# Patient Record
Sex: FEMALE
[De-identification: names, ages, dates, MRNs, and addresses within clinical notes are randomized per-mention and may not be internally consistent; named-entity substitution may affect disease eponyms.]

---

## 2017-06-04 ENCOUNTER — HOSPITAL ENCOUNTER (EMERGENCY)
Dept: HOSPITAL 31 - C.ER | Age: 37
Discharge: HOME | End: 2017-06-04
Payer: COMMERCIAL

## 2017-06-04 VITALS
TEMPERATURE: 98.6 F | SYSTOLIC BLOOD PRESSURE: 106 MMHG | OXYGEN SATURATION: 97 % | DIASTOLIC BLOOD PRESSURE: 64 MMHG | HEART RATE: 82 BPM

## 2017-06-04 VITALS — RESPIRATION RATE: 18 BRPM

## 2017-06-04 DIAGNOSIS — N83.209: Primary | ICD-10-CM

## 2017-06-04 LAB
ALBUMIN/GLOB SERPL: 1.6 {RATIO}
ALP SERPL-CCNC: 54 U/L
ALT SERPL-CCNC: 19 U/L
AST SERPL-CCNC: 19 U/L
BACTERIA #/AREA URNS HPF: (no result) /[HPF]
BASOPHILS # BLD AUTO: 0.1 K/UL
BASOPHILS NFR BLD: 0.7 %
BILIRUB SERPL-MCNC: 0.6 MG/DL
BILIRUB UR-MCNC: NEGATIVE MG/DL
BUN SERPL-MCNC: 10 MG/DL
CALCIUM SERPL-MCNC: 9.5 MG/DL
CHLORIDE SERPL-SCNC: 100 MMOL/L
CO2 SERPL-SCNC: 27 MMOL/L
EOSINOPHIL # BLD AUTO: 0.1 K/UL
EOSINOPHIL NFR BLD: 1.3 %
ERYTHROCYTE [DISTWIDTH] IN BLOOD BY AUTOMATED COUNT: 12.2 %
GLOBULIN SER-MCNC: 2.9 GM/DL
GLUCOSE SERPL-MCNC: 92 MG/DL
GLUCOSE UR STRIP-MCNC: NORMAL MG/DL
HCT VFR BLD CALC: 37.8 %
KETONES UR STRIP-MCNC: NEGATIVE MG/DL
LEUKOCYTE ESTERASE UR-ACNC: (no result) LEU/UL
LYMPHOCYTES # BLD AUTO: 2.3 K/UL
LYMPHOCYTES NFR BLD AUTO: 24.5 %
MCH RBC QN AUTO: 28.8 PG
MCHC RBC AUTO-ENTMCNC: 33 G/DL
MCV RBC AUTO: 87.4 FL
MONOCYTES # BLD: 0.6 K/UL
MONOCYTES NFR BLD: 6.7 %
NRBC BLD AUTO-RTO: 0 %
PH UR STRIP: 7 [PH]
PLATELET # BLD: 248 K/UL
PMV BLD AUTO: 10.8 FL
POTASSIUM SERPL-SCNC: 4.1 MMOL/L
PROT SERPL-MCNC: 7.7 G/DL
PROT UR STRIP-MCNC: NEGATIVE MG/DL
RBC # UR STRIP: NEGATIVE /UL
RBC #/AREA URNS HPF: < 1 /HPF
SODIUM SERPL-SCNC: 139 MMOL/L
SP GR UR STRIP: 1
UROBILINOGEN UR-MCNC: NORMAL MG/DL
WBC # BLD AUTO: 9.6 K/UL
WBC #/AREA URNS HPF: < 1 /HPF

## 2017-06-04 PROCEDURE — 81001 URINALYSIS AUTO W/SCOPE: CPT

## 2017-06-04 PROCEDURE — 99285 EMERGENCY DEPT VISIT HI MDM: CPT

## 2017-06-04 PROCEDURE — 80053 COMPREHEN METABOLIC PANEL: CPT

## 2017-06-04 PROCEDURE — 85025 COMPLETE CBC W/AUTO DIFF WBC: CPT

## 2017-06-04 PROCEDURE — 84703 CHORIONIC GONADOTROPIN ASSAY: CPT

## 2017-06-04 PROCEDURE — 96374 THER/PROPH/DIAG INJ IV PUSH: CPT

## 2017-06-04 PROCEDURE — 74177 CT ABD & PELVIS W/CONTRAST: CPT

## 2017-06-04 PROCEDURE — 83690 ASSAY OF LIPASE: CPT

## 2017-06-04 PROCEDURE — 96361 HYDRATE IV INFUSION ADD-ON: CPT

## 2017-06-04 NOTE — CT
EXAM:

  CT Abdomen and Pelvis With Intravenous Contrast



CLINICAL HISTORY:

  37 years old, female; Pain; Abdominal pain; Generalized; Additional info: Abd 

pain



TECHNIQUE:

  Axial computed tomography images of the abdomen and pelvis with intravenous 

contrast.  This CT exam was performed using one or more of the following dose 

reduction techniques:  automated exposure control, adjustment of the mA and/or 

kV according to patient size, and/or use of iterative reconstruction technique.

  Coronal and sagittal reformatted images were created and reviewed.



CONTRAST:

  100 mL of [id number] administered intravenously.



EXAM DATE/TIME:

  6/4/2017 6:38 PM



COMPARISON:

  No relevant prior studies available.



FINDINGS:

  LIMITATIONS:  Exam is somewhat limited by mild streak/motion artifact.

  LOWER THORAX:  No infiltrate seen in the lung bases.



 ABDOMEN:

  LIVER:  No acute abnormality of the liver identified.

  GALLBLADDER AND BILE DUCTS:  No CT evidence of acute cholecystitis.  No 

evidence of significant biliary ductal dilatation.

  PANCREAS:  No CT evidence of acute pancreatitis.

  SPLEEN:  No acute abnormality of the spleen identified.

  ADRENALS:  No acute abnormality of the adrenal glands identified.

  KIDNEYS AND URETERS:  No acute abnormality of the kidneys identified. No 

evidence of significant hydrouereteronephrosis.

  STOMACH AND BOWEL:  No acute abnormality of the stomach or duodenum 

identified. No evidence of small bowel obstruction. No acute abnormality of the 

colon identified.

  APPENDIX:  Normal appendix is not seen, however, there are no significant 

inflammatory changes visualized in the expected location of the appendix to 

suggest appendicitis. Recommend clinical correlation.



 PELVIS:

  BLADDER:  Mild thickening of the bladder wall.

  REPRODUCTIVE:  2.5 cm cystic lesion with a thin, enhancing and collapsed soft 

tissue rim in the left ovary.  This has an appearance suggestive of a recently 

ruptured/involuting ovarian cyst, such as a corpus luteal cyst.  Followup 

pelvic ultrasound as clinically indicated.



 ABDOMEN and PELVIS:

  INTRAPERITONEAL SPACE:  Small amount of free fluid in the cul-de-sac. This is 

most likely physiologic in nature.  No evidence of free air.

  BONES/JOINTS:  No acute fractures or other acute bony abnormality noted.

  SOFT TISSUES:  Small umbilical hernia, containing only fat.

  VASCULATURE:  No evidence of abdominal aortic aneurysm. No evidence of 

periaortic hemorrhage.

  LYMPH NODES:  No evidence of diffuse lymphadenopathy.



IMPRESSION:     

- Mild bladder wall thickening. This is a nonspecific finding, but can be seen 

with cystitis. Recommend clinical correlation.

- Otherwise, no evidence of significant acute process. Appendix is not seen, 

however.

- Small amount of free fluid in the cul-de-sac, most likely physiologic in 

nature.

- Findings compatible with a small right involuting cystic lesion in the left 

ovary.

- See above for remaining findings.

## 2017-06-04 NOTE — C.PDOC
History Of Present Illness


Patient is a 38 y/o female that presents to the emergency department for 

evaluation of left sided abdominal pain associated with nausea for the last 2 

weeks. Pt states her pain radiates from the LUQ to the LLQ, and is described as 

constant in nature. Pt states that pain is worse when sitting, and laying down, 

and is better with walking. Otherwise, denies any vomiting, diarrhea, 

constipation, change in bowel habits, urinary symptoms, back pain, fever, chills

, or any other associated symptoms at this time.





Time Seen by Provider: 06/04/17 18:33


Chief Complaint (Nursing): Abdominal Pain


History Per: Patient


History/Exam Limitations: no limitations


Onset/Duration Of Symptoms: Days (2 weeks), Persistent


Current Symptoms Are (Timing): Still Present


Location Of Pain/Discomfort: LUQ, LLQ


Radiation Of Pain To:: None


Quality Of Discomfort: "Pain"


Associated Symptoms: Nausea.  denies: Fever, Chills, Vomiting, Diarrhea, Loss 

Of Appetite, Back Pain, Chest Pain, Constipation, Urinary Symptoms


Exacerbating Factors: Supine, Other (sitting)


Alleviating Factors: None


Recent travel outside of the [place]: No


Additional History Per: Patient


Abnormal Vaginal Bleeding: No





Past Medical History


Reviewed: Historical Data, Nursing Documentation, Vital Signs


Vital Signs: 


 Last Vital Signs











Temp  98.1 F   06/04/17 17:22


 


Pulse  90   06/04/17 17:22


 


Resp  18   06/04/17 17:22


 


BP  127/80   06/04/17 17:22


 


Pulse Ox  100   06/04/17 22:13














- Medical History


PMH: Back Problems, Migraine


Family History: States: No Known Family Hx





- Social History


Hx Tobacco Use: No


Hx Alcohol Use: No


Hx Substance Use: No





- Immunization History


Hx Tetanus Toxoid Vaccination: No


Hx Influenza Vaccination: No


Hx Pneumococcal Vaccination: No





Review Of Systems


Except As Marked, All Systems Reviewed And Found Negative.


Constitutional: Negative for: Fever, Chills


Gastrointestinal: Positive for: Nausea, Abdominal Pain.  Negative for: Vomiting

, Diarrhea, Constipation


Genitourinary: Negative for: Dysuria, Frequency, Incontinence, Hematuria, 

Vaginal Discharge


Musculoskeletal: Negative for: Back Pain





Physical Exam





- Physical Exam


Appears: Non-toxic, No Acute Distress


Skin: Normal Color, Warm, Dry


Head: Atraumatic, Normacephalic


Eye(s): bilateral: Normal Inspection, EOMI


Neck: Normal ROM, Supple


Chest: Symmetrical


Cardiovascular: Rhythm Regular


Respiratory: Normal Breath Sounds, No Rales, No Rhonchi, No Wheezing


Gastrointestinal/Abdominal: Soft, Tenderness (LUQ, LLQ, mid abdomen), No 

Distention, Guarding, No Rebound


Extremity: Normal ROM


Neurological/Psych: Oriented x3, Normal Speech, Normal Cognition





ED Course And Treatment





- Laboratory Results


Result Diagrams: 


 06/04/17 19:17





 06/04/17 19:17


Lab Interpretation: Normal


O2 Sat by Pulse Oximetry: 100 (on RA)


Pulse Ox Interpretation: Normal





- CT Scan/US


  ** CT ABDOMEN AND PELVIS


Other Rad Studies (CT/US): Read By Radiologist, Radiology Report Reviewed


CT/US Interpretation: EXAM:  CT Abdomen and Pelvis With Intravenous Contrast.  

CLINICAL HISTORY:  37 years old, female; Pain; Abdominal pain; Generalized; 

Additional info: Abd pain.  TECHNIQUE:  Axial computed tomography images of the 

abdomen and pelvis with intravenous contrast. This CT.  exam was performed 

using one or more of the following dose reduction techniques: automated.  

exposure control, adjustment of the mA and/or kV according to patient size, and/

or use of iterative.  reconstruction technique.  Coronal and sagittal 

reformatted images were created and reviewed.  CONTRAST:  100 mL of [id number] 

administered intravenously.  EXAM DATE/TIME:  6/4/2017 6:38 PM.  COMPARISON:  

No relevant prior studies available.  FINDINGS:  LIMITATIONS: Exam is somewhat 

limited by mild streak/motion artifact.  LOWER THORAX: No infiltrate seen in 

the lung bases.  ABDOMEN:  LIVER: No acute abnormality of the liver identified.

  GALLBLADDER AND BILE DUCTS: No CT evidence of acute cholecystitis. No 

evidence of.  significant biliary ductal dilatation.  PANCREAS: No CT evidence 

of acute pancreatitis.  SPLEEN: No acute abnormality of the spleen identified.  

ADRENALS: No acute abnormality of the adrenal glands identified.  KIDNEYS AND 

URETERS: No acute abnormality of the kidneys identified. No evidence of.  

significant hydrouereteronephrosis.  STOMACH AND BOWEL: No acute abnormality of 

the stomach or duodenum identified. No.  evidence of small bowel obstruction. 

No acute abnormality of the colon identified.  APPENDIX: Normal appendix is not 

seen, however, there are no significant inflammatory changes.  visualized in 

the expected location of the appendix to suggest appendicitis. Recommend 

clinical.  correlation.  PELVIS:  BLADDER: Mild thickening of the bladder wall.

  REPRODUCTIVE: 2.5 cm cystic lesion with a thin, enhancing and collapsed soft 

tissue rim in the left.  ovary. This has an appearance suggestive of a recently 

ruptured/involuting ovarian cyst, such as a.  corpus luteal cyst. Followup 

pelvic ultrasound as clinically indicated.  ABDOMEN and PELVIS:  

INTRAPERITONEAL SPACE: Small amount of free fluid in the cul-de-sac. This is 

most likely.  physiologic in nature. No evidence of free air.  BONES/JOINTS: No 

acute fractures or other acute bony abnormality noted.  SOFT TISSUES: Small 

umbilical hernia, containing only fat.  VASCULATURE: No evidence of abdominal 

aortic aneurysm. No evidence of periaortic.  hemorrhage.  LYMPH NODES: No 

evidence of diffuse lymphadenopathy.  IMPRESSION:  - Mild bladder wall 

thickening. This is a nonspecific finding, but can be seen with cystitis.  

Recommend clinical correlation.  - Otherwise, no evidence of significant acute 

process. Appendix is not seen, however.  - Small amount of free fluid in the cul

-de-sac, most likely physiologic in nature.  - Findings compatible with a small 

right involuting cystic lesion in the left ovary.  - See above for remaining 

findings.


Progress Note: Labs, abd & pelvis CT was ordered and reviewed. Patient was 

given IV fluids, Iohexol, and Zofran in the ER.


Reevaluation Time: 22:17


Reassessment Condition: Improved (Patient remains comfortable.)





Disposition





- Disposition


Referrals: 


Stevenson Fuller MD [Staff Provider] - 


Disposition: HOME/ ROUTINE


Disposition Time: 22:17


Condition: STABLE


Prescriptions: 


Naproxen [Naprosyn] 500 mg PO BID PRN #30 tablet


 PRN Reason: Pain, Severe (8-10)


Instructions:  Ovarian Cyst (ED)





- Clinical Impression


Clinical Impression: 


 Ovarian cyst








- Scribe Statement


The provider has reviewed the documentation as recorded by the Hema Fuller


Provider Attestation: 








All medical record entries made by the Hema were at my direction and 

personally dictated by me. I have reviewed the chart and agree that the record 

accurately reflects my personal performance of the history, physical exam, 

medical decision making, and the department course for this patient. I have 

also personally directed, reviewed, and agree with the discharge instructions 

and disposition.

## 2018-01-10 ENCOUNTER — HOSPITAL ENCOUNTER (OUTPATIENT)
Dept: HOSPITAL 31 - C.SDS | Age: 38
Discharge: HOME | End: 2018-01-10
Attending: OBSTETRICS & GYNECOLOGY
Payer: COMMERCIAL

## 2018-01-10 VITALS
SYSTOLIC BLOOD PRESSURE: 114 MMHG | RESPIRATION RATE: 15 BRPM | DIASTOLIC BLOOD PRESSURE: 68 MMHG | HEART RATE: 66 BPM | TEMPERATURE: 97.6 F

## 2018-01-10 VITALS — OXYGEN SATURATION: 100 %

## 2018-01-10 DIAGNOSIS — N87.0: Primary | ICD-10-CM

## 2018-01-10 PROCEDURE — 88305 TISSUE EXAM BY PATHOLOGIST: CPT

## 2018-01-10 PROCEDURE — 88307 TISSUE EXAM BY PATHOLOGIST: CPT

## 2018-01-10 PROCEDURE — 57522 CONIZATION OF CERVIX: CPT

## 2018-01-17 NOTE — OP
PROCEDURE DATE:



PREOPERATIVE DIAGNOSIS:  A 37-year-old  2, para 2 with cervical

dysplasia.



POSTOPERATIVE DIAGNOSIS:  A 37-year-old  2, para 2 with cervical

dysplasia.



SURGEON:  Jona Granda MD.



ASSISTANT:  None.



TYPE OF ANESTHESIA:  General.



ANESTHESIA ADMINISTERED BY:  Dr. Bagley.



PROCEDURE PERFORMED:  Loop electrosurgical excision procedure.



DESCRIPTION OF PROCEDURE:  After informed consent was obtained, the patient

was brought to the operating room, placed on the table where general

anesthesia was given.  She was prepped and draped in normal sterile

fashion.  Anterior lip of the cervix was grasped with a tenaculum.  A 20 mL

of lidocaine was given.  After that, 2 mm electrode was used to do the

LEEP.  When the whole portion of the LEEP was done, then the ECC was done. 

Then _____ was done.  It was unremarkable.  The patient tolerated the

procedure well.  _____.







__________________________________________

Jona Granda MD





DD:  2018 13:24:35

DT:  2018 19:08:52

Job # [id number]

## 2018-06-20 ENCOUNTER — HOSPITAL ENCOUNTER (OUTPATIENT)
Dept: HOSPITAL 31 - C.SDS | Age: 38
Discharge: HOME | End: 2018-06-20
Attending: OBSTETRICS & GYNECOLOGY
Payer: COMMERCIAL

## 2018-06-20 VITALS
DIASTOLIC BLOOD PRESSURE: 63 MMHG | SYSTOLIC BLOOD PRESSURE: 113 MMHG | HEART RATE: 90 BPM | OXYGEN SATURATION: 98 % | TEMPERATURE: 97.9 F

## 2018-06-20 VITALS — RESPIRATION RATE: 16 BRPM

## 2018-06-20 DIAGNOSIS — Z90.49: ICD-10-CM

## 2018-06-20 DIAGNOSIS — Z90.13: ICD-10-CM

## 2018-06-20 DIAGNOSIS — Z98.890: ICD-10-CM

## 2018-06-20 DIAGNOSIS — Z79.1: ICD-10-CM

## 2018-06-20 DIAGNOSIS — N73.6: ICD-10-CM

## 2018-06-20 DIAGNOSIS — N83.201: Primary | ICD-10-CM

## 2018-06-20 PROCEDURE — 58662 LAPAROSCOPY EXCISE LESIONS: CPT

## 2018-06-20 PROCEDURE — 88304 TISSUE EXAM BY PATHOLOGIST: CPT

## 2018-06-20 PROCEDURE — 88104 CYTOPATH FL NONGYN SMEARS: CPT

## 2018-06-20 PROCEDURE — 49329 UNLSTD LAPS PX ABD PERTM&OMN: CPT

## 2018-06-20 PROCEDURE — 88305 TISSUE EXAM BY PATHOLOGIST: CPT

## 2018-06-20 RX ADMIN — HYDROMORPHONE HYDROCHLORIDE PRN MG: 1 INJECTION, SOLUTION INTRAMUSCULAR; INTRAVENOUS; SUBCUTANEOUS at 09:30

## 2018-06-20 RX ADMIN — HYDROMORPHONE HYDROCHLORIDE PRN MG: 1 INJECTION, SOLUTION INTRAMUSCULAR; INTRAVENOUS; SUBCUTANEOUS at 09:20

## 2018-06-20 NOTE — PCM.SURG1
Surgeon's Initial Post Op Note





- Surgeon's Notes


Surgeon: dr friedman


Assistant: dr ford


Type of Anesthesia: General LMA


Anesthesia Administered By: dr hinkle


Pre-Operative Diagnosis: 38 yr  with pelvic pain/pelvic mass


Operative Findings: se the op reort


Post-Operative Diagnosis: same


Operation Performed: diagnosic lap, right ovarian cystectomy


Specimen/Specimens Removed: right ovary cyst


Estimated Blood Loss: EBL {In ML}: 50


Blood Products Given: Autologous (Cell Saver)


Drains Used: No Drains


Post-Op Condition: Good


Date of Surgery/Procedure: 18


Time of Surgery/Procedure: 09:30

## 2018-06-21 NOTE — OP
PROCEDURE DATE:  2018



PREOPERATIVE DIAGNOSIS:  A 38-year-old  2, para 2 with pelvic pain

and right ovarian mass.



POSTOPERATIVE DIAGNOSIS:  A 38-year-old  2, para 2 with pelvic pain

and right ovarian mass.



SURGEON:  Jona Granda MD



ASSISTANT:   Dr. Rueda, who was present throughout the surgical exposure.



SURGERY PERFORMED:  Diagnostic laparoscopy, laparoscopic right ovarian

cystectomy, and lysis of adhesion.



COMPLICATIONS:  None.



ESTIMATE BLOOD LOSS:  50 mL.



TYPE OF ANESTHESIA:  General anesthesia.



ANESTHESIOLOGIST:  Dr. Earl.



DESCRIPTION OF PROCEDURE:  After informed consent was obtained, the patient

was brought to the operating room, placed on the table where general

anesthesia was given.  Once anesthesia was found to be sufficient, she was

prepped and draped in normal sterile fashion.  Examination revealed uterus

to be 7-week size.  No pelvic or adnexal masses.  The anterior lip of

cervix was grasped with a tenaculum.  Gentle dilatation of the cervix was

done.  HUMI catheter was inserted for manipulation of the uterus.  After

that, attention was turned towards the patient's abdomen.  The lower

uterine segment incision was made to the umbilicus, 10 mL of Marcaine was

given and lifted up.  The skin was cut with a knife, and then the fascia

was lifted up with the two Kochers.  It was cut and tied up with 2-0

chromic, 2-0 Vicryl.  After that, intra-abdominal placement with Rodríguez was

confirmed using gas.  After that, a 5-mm port was placed on the left side. 

There was adhesion of omentum going to the pelvic wall and after that on

the right side of window, a mass which was 4-5 cm.  A decision was made to

place two 5-mm ports on the left and the right side which was placed under

direct visualization.  After that, pelvic cytology was taken and it was

sent for pathology.  After that, adhesions were taken on the left side

using LigaSure while lifting up on the other side.  There was good

hemostasis.  No bleeding.  Then, the decision was made to do right ovarian

cystectomy.  The left ovary looks normal.  Then, the right ovary was lifted

up.  Then, it was cut and cauterized with the LigaSure.  The cystectomy was

performed.  After that, the ovarian site was cauterized.  Lot of irrigation

was done and found to be hemostatic.  Then after that, EndoCatch was placed

at the site of the camera.  After that then, the right ovarian cyst was

placed in EndoCatch that was taken out.  The left and right ovaries look

normal.  No bleeding.  A lot of irrigation was done and found to be

hemostatic, no bleeding.  Then, the FloSeal was placed and hemostasis. 

After that, all the ports were removed.  A 2-0 Vicryl was used for the

fascia at the site of umbilicus, and the skin was closed using a 3-0

Monocryl.  The patient tolerated the procedure well.  Lap, sponge, and

instrument counts were correct x2.  The patient was given Bactrim DS and

pain medications.  To follow up with Dr. Granda in two weeks.





__________________________________________

Jona Granda MD





DD:  2018 19:15:19

DT:  2018 1:45:49

Job # [id number]

## 2018-07-11 ENCOUNTER — HOSPITAL ENCOUNTER (EMERGENCY)
Dept: HOSPITAL 31 - C.ER | Age: 38
Discharge: HOME | End: 2018-07-11
Payer: COMMERCIAL

## 2018-07-11 VITALS — RESPIRATION RATE: 18 BRPM | OXYGEN SATURATION: 99 %

## 2018-07-11 VITALS — HEART RATE: 78 BPM | SYSTOLIC BLOOD PRESSURE: 122 MMHG | TEMPERATURE: 99 F | DIASTOLIC BLOOD PRESSURE: 82 MMHG

## 2018-07-11 DIAGNOSIS — J90: Primary | ICD-10-CM

## 2018-07-11 NOTE — RAD
Date of service: 



07/11/2018



HISTORY:

cough  



COMPARISON:

Chest radiograph dated 01/04/2016



TECHNIQUE:

Chest PA and lateral



FINDINGS:



LUNGS:

No active pulmonary disease.



PLEURA:

Small bilateral pleural effusions. No pneumothorax apparent.



CARDIOVASCULAR:

Normal.



OSSEOUS STRUCTURES:

No significant abnormalities.



VISUALIZED UPPER ABDOMEN:

Normal.



OTHER FINDINGS:

None.



IMPRESSION:

Small bilateral pleural effusions.

## 2018-07-11 NOTE — C.PDOC
History Of Present Illness


39 y/o female presents to ED with c/o cough for 3 days and discomfort described 

as tightness to right lower rib worse with deep inspiration. Patient denies 

fever, sob, nausea, vomiting or chest pain. 


Time Seen by Provider: 07/11/18 11:18


Chief Complaint (Nursing): Cough, Cold, Congestion


History Per: Patient


History/Exam Limitations: no limitations


Onset/Duration Of Symptoms: Days


Current Symptoms Are (Timing): Still Present





Past Medical History


Reviewed: Historical Data, Nursing Documentation, Vital Signs


Vital Signs: 


 Last Vital Signs











Temp  99 F   07/11/18 12:32


 


Pulse  78   07/11/18 12:32


 


Resp  18   07/11/18 12:32


 


BP  122/82   07/11/18 12:32


 


Pulse Ox  99   07/11/18 12:33














- Medical History


PMH: Back Problems


Surgical History: Appendectomy


Family History: States: No Known Family Hx





- Social History


Hx Tobacco Use: No


Hx Alcohol Use: No


Hx Substance Use: No





- Immunization History


Hx Tetanus Toxoid Vaccination: No


Hx Influenza Vaccination: No


Hx Pneumococcal Vaccination: No





Review Of Systems


Constitutional: Negative for: Fever, Chills


Cardiovascular: Negative for: Chest Pain


Respiratory: Positive for: Cough.  Negative for: Shortness of Breath


Gastrointestinal: Negative for: Nausea, Vomiting


Skin: Negative for: Rash





Physical Exam





- Physical Exam


Appears: Non-toxic, No Acute Distress


Skin: Warm, Dry, No Rash


Head: Atraumatic, Normacephalic


Eye(s): bilateral: Normal Inspection


Oral Mucosa: Moist


Neck: Normal ROM, Supple


Chest: Symmetrical


Cardiovascular: Rhythm Regular, No Murmur, No JVD


Respiratory: Normal Breath Sounds, No Accessory Muscle Use, No Rales, No Rhonchi

, No Wheezing


Gastrointestinal/Abdominal: Soft, No Tenderness, No Guarding, No Rebound


Extremity: Bilateral: Atraumatic, Normal Color And Temperature, Normal ROM


Neurological/Psych: Oriented x3, Normal Speech


Gait: Steady





ED Course And Treatment


O2 Sat by Pulse Oximetry: 99 (RA)


Pulse Ox Interpretation: Normal





Medical Decision Making


Medical Decision Making: 


Impression: cough and rib pain


Plan: CXR   


Progress: Xray read by radiologist shows small bilateral pleural effusion, no 

pneumothorax. Patient remained afebrile and in no respiratory distress. Vital 

signs are stable. Discussed results with patient and plan for discharge home 

with RX. I recommend that she follow up with PMD in few days. Advise return to 

hospital if symptoms do not improve.





Disposition


Counseled Patient/Family Regarding: Diagnosis, Need For Followup





- Disposition


Referrals: 


Stevenson Fuller MD [Staff Provider] - 


Disposition: HOME/ ROUTINE


Disposition Time: 12:17


Condition: STABLE


Additional Instructions: 


It is important that you follow up with your doctor in 2-3 days


Take medication daily


Return to the emergency department at any time if symptoms persist or worsen.


Prescriptions: 


predniSONE [predniSONE Tab] 40 mg PO DAILY #10 tab


Instructions:  Pleural Effusion (DC)


Forms:  [facility] (English)





- POA


Present On Arrival: None





- Clinical Impression


Clinical Impression: 


 Small pleural effusion








- PA / NP / Resident Statement


MD/DO has reviewed & agrees with the documentation as recorded.





- Scribe Statement


The provider has reviewed the documentation as recorded by the Makenzieibalireza Skleton





All medical record entries made by the Makenzieibalireza were at my direction and 

personally dictated by me. I have reviewed the chart and agree that the record 

accurately reflects my personal performance of the history, physical exam, 

medical decision making, and the department course for this patient. I have 

also personally directed, reviewed, and agree with the discharge instructions 

and disposition.